# Patient Record
Sex: FEMALE | Race: WHITE | NOT HISPANIC OR LATINO | Employment: UNEMPLOYED | URBAN - METROPOLITAN AREA
[De-identification: names, ages, dates, MRNs, and addresses within clinical notes are randomized per-mention and may not be internally consistent; named-entity substitution may affect disease eponyms.]

---

## 2017-03-26 ENCOUNTER — ALLSCRIPTS OFFICE VISIT (OUTPATIENT)
Dept: OTHER | Facility: OTHER | Age: 37
End: 2017-03-26

## 2018-01-15 VITALS
SYSTOLIC BLOOD PRESSURE: 106 MMHG | BODY MASS INDEX: 21.97 KG/M2 | RESPIRATION RATE: 18 BRPM | HEIGHT: 67 IN | TEMPERATURE: 98.3 F | DIASTOLIC BLOOD PRESSURE: 60 MMHG | WEIGHT: 140 LBS | HEART RATE: 74 BPM | OXYGEN SATURATION: 100 %

## 2018-07-23 ENCOUNTER — OFFICE VISIT (OUTPATIENT)
Dept: URGENT CARE | Facility: CLINIC | Age: 38
End: 2018-07-23
Payer: COMMERCIAL

## 2018-07-23 VITALS
HEART RATE: 66 BPM | BODY MASS INDEX: 21.35 KG/M2 | HEIGHT: 67 IN | TEMPERATURE: 98.8 F | RESPIRATION RATE: 16 BRPM | DIASTOLIC BLOOD PRESSURE: 60 MMHG | WEIGHT: 136 LBS | OXYGEN SATURATION: 100 % | SYSTOLIC BLOOD PRESSURE: 104 MMHG

## 2018-07-23 DIAGNOSIS — S61.219A LACERATION WITHOUT FOREIGN BODY OF UNSPECIFIED FINGER WITHOUT DAMAGE TO NAIL, INITIAL ENCOUNTER: Primary | ICD-10-CM

## 2018-07-23 PROCEDURE — 90471 IMMUNIZATION ADMIN: CPT

## 2018-07-23 PROCEDURE — 99214 OFFICE O/P EST MOD 30 MIN: CPT | Performed by: PHYSICIAN ASSISTANT

## 2018-07-23 PROCEDURE — 90715 TDAP VACCINE 7 YRS/> IM: CPT

## 2018-07-23 NOTE — PROGRESS NOTES
[facility]        NAME: Filiberto Matos is a 43 y o  female  : 1980    MRN: [id number]  DATE: 2018  TIME: 5:48 PM    Assessment and Plan   Laceration without foreign body of unspecified finger without damage to nail, initial encounter [S61 [id number]]  1  Laceration without foreign body of unspecified finger without damage to nail, initial encounter  TDAP Vaccine greater than or equal to 6yo     Laceration repair  Date/Time: 2018 11:56 AM  Performed by: Gerry Vicente by: Ricardo Cobb   Patient identity confirmed: verbally with patient  Body area: upper extremity  Location details: right ring finger  Laceration length: 1 cm  Foreign bodies: no foreign bodies  Tendon involvement: none  Nerve involvement: none  Vascular damage: no      Procedure Details:  Preparation: Patient was prepped and draped in the usual sterile fashion  Irrigation solution: saline  Irrigation method: syringe  Amount of cleaning: standard  Debridement: none  Degree of undermining: none  Skin closure: glue and Steri-Strips  Patient tolerance: Patient tolerated the procedure well with no immediate complications          Patient Instructions   Right Fourth Digit Laceration:   -There is a small, superficial wound of the ventral aspect of the right fourth digit over the DIP joint  The wound was cleansed in a sterile fashion and dermabond was applied and finished with a steri-strip  The wound was dressed and wound care was discussed in depth    -Tdap was given today  -If your wound becomes red, swollen, tender or purulent follow up immediately with your PCP for a recheck  Follow up with PCP in 3-5 days  Proceed to  ER if symptoms worsen  Chief Complaint     Chief Complaint   Patient presents with    Laceration     right hand fourth digit laceration onset this morning while reaching into refrigerator cut finger on aluminum lid of can; Tetanus ????          History of Present Illness       The patient presents today for right fourth digit laceration that she sustained today at 8am  The patient states that she reached into the fridge and cut her finger on a coconut milk can  She states that she is unsure of when her last Tdap vaccine was  She states that hemostasis was achieved and she denies numbness, tingling or weakness of the right fourth digit  Review of Systems   Review of Systems   Constitutional: Negative for chills, fatigue and fever  Skin: Positive for wound (right fourth digit laceration)  Negative for color change, pallor and rash  Allergic/Immunologic: Negative for immunocompromised state  Neurological: Negative for dizziness, weakness, light-headedness and numbness  Hematological: Negative for adenopathy  Current Medications     No current outpatient prescriptions on file  Current Allergies     Allergies as of 07/23/2018    (No Known Allergies)            The following portions of the patient's history were reviewed and updated as appropriate: allergies, current medications, past family history, past medical history, past social history, past surgical history and problem list      History reviewed  No pertinent past medical history  Past Surgical History:   Procedure Laterality Date    WISDOM TOOTH EXTRACTION         Family History   Problem Relation Age of Onset    Stroke Mother     Heart disease Father          Medications have been verified  Objective   /60   Pulse 66   Temp 98 8 °F (37 1 °C)   Resp 16   Ht 5' 7" (1 702 m)   Wt 61 7 kg (136 lb)   LMP 07/04/2018 (Approximate)   SpO2 100%   Breastfeeding? No   BMI 21 30 kg/m²        Physical Exam     Physical Exam   Constitutional: She appears well-developed and well-nourished  No distress  Cardiovascular: Normal rate and regular rhythm  Pulmonary/Chest: Effort normal and breath sounds normal    Musculoskeletal:        Right hand: She exhibits laceration   She exhibits normal range of motion, no tenderness, normal capillary refill, no deformity and no swelling  Normal sensation noted  Normal strength noted  Neurological: She has normal strength  No sensory deficit  Skin: Laceration (There is a  5cm linear laceration of the ventral aspect of the right fourth digit over the DIP joint) noted  She is not diaphoretic  Nursing note and vitals reviewed

## 2018-07-23 NOTE — PATIENT INSTRUCTIONS
Skin Adhesive Care   WHAT YOU NEED TO KNOW:   Skin adhesive is medical glue used to close wounds  It is a substitute for staples and stitches  Skin adhesive wound closures take less time and do not require anesthesia  You have less pain and a lower risk of infection than with staples or stitches  Skin adhesive will fall off after the wound is healed  DISCHARGE INSTRUCTIONS:   Self-care:   · Keep your wound clean and dry  for 1 to 5 days  You can shower 24 hours after the skin adhesive is applied  Lightly pat your wound dry after you shower  · Do not soak  your wound in water, such as in a bath or hot tub  · Do not scrub  your wound or pick at the adhesive  This can make your wound reopen  · Do not apply ointments  to your wound  These include antibiotic and other ointments that contain petroleum jelly  These products will remove skin adhesive and reopen your wound  Follow up with your healthcare provider as directed:  Write down your questions so you remember to ask them during your visits  Contact your healthcare provider if:   · You have a fever  · Your wound is red and warm to touch  · You have questions or concerns about your condition or care  Return to the emergency department if:   · Your wound has fluid draining from it  · Your wound opens  © 2017 [address] Information is for End User's use only and may not be sold, redistributed or otherwise used for commercial purposes  All illustrations and images included in CareNotes® are the copyrighted property of [employer]  or Dustin [employer]  The above information is an  only  It is not intended as medical advice for individual conditions or treatments  Talk to your doctor, nurse or pharmacist before following any medical regimen to see if it is safe and effective for you      Right Fourth Digit Laceration:   -There is a small, superficial wound of the ventral aspect of the right fourth digit over the DIP joint  The wound was cleansed in a sterile fashion and dermabond was applied and finished with a steri-strip  The wound was dressed and wound care was discussed in depth    -Tdap was given today  -If your wound becomes red, swollen, tender or purulent follow up immediately with your PCP for a recheck

## 2019-12-28 ENCOUNTER — OFFICE VISIT (OUTPATIENT)
Dept: URGENT CARE | Facility: CLINIC | Age: 39
End: 2019-12-28
Payer: COMMERCIAL

## 2019-12-28 VITALS
RESPIRATION RATE: 16 BRPM | HEART RATE: 67 BPM | WEIGHT: 138 LBS | TEMPERATURE: 97.4 F | OXYGEN SATURATION: 100 % | DIASTOLIC BLOOD PRESSURE: 62 MMHG | SYSTOLIC BLOOD PRESSURE: 98 MMHG | BODY MASS INDEX: 21.61 KG/M2

## 2019-12-28 DIAGNOSIS — R68.84 JAW PAIN: Primary | ICD-10-CM

## 2019-12-28 PROCEDURE — 99213 OFFICE O/P EST LOW 20 MIN: CPT | Performed by: NURSE PRACTITIONER

## 2019-12-28 RX ORDER — NAPROXEN 500 MG/1
500 TABLET ORAL 2 TIMES DAILY WITH MEALS
Qty: 30 TABLET | Refills: 0 | Status: SHIPPED | OUTPATIENT
Start: 2019-12-28

## 2019-12-28 NOTE — PATIENT INSTRUCTIONS
Naprosyn 500mg twice daily with food   Schedule appt with dentist or oral surgeon as soon as possible  Alternate ice/heat to alleviate symptoms  Temporomandibular Disorder   WHAT YOU NEED TO KNOW:   What is temporomandibular disorder? Temporomandibular disorder is a condition that causes pain in your jaw  The disorder affects the joint between your temporal bone and your mandible (jawbone)  The muscles and nerves around the joint are also affected  What causes temporomandibular disorder? · Dislocation of the cartilage disc in the joint    · Deformities of the jaw    · Inflammation, infection, arthritis, muscle problems, or tumors in the jaw area     · Injury to or fracture of the jawbone    · Muscle strain from chewing or teeth clenching or grinding  What are the signs and symptoms of temporomandibular disorder? · Popping or grating sound when you open or close your mouth    · Headache or pain in your jaw, ear, neck, or face    · Pain or swelling of the jaw muscles    · Tingling or numbness in the jaw or face    · Trouble opening or closing your mouth, or your jaw locks  How is temporomandibular disorder diagnosed? Your healthcare provider will examine your jaw, face, and neck  He will ask you about your health conditions or injuries  You may also need the following tests:  · X-rays: You may need to have x-rays of your skull, jaw, or teeth  · Arthrogram:  This is an x-ray that uses contrast dye to help the pictures show up better  Tell the healthcare provider if you have ever had an allergic reaction to contrast dye  · MRI:  This scan uses powerful magnets and a computer to take pictures of your jaw  You may be given contrast dye to help the pictures show up better  Tell the healthcare provider if you have ever had an allergic reaction to contrast dye  Do not enter the MRI room with anything metal  Metal can cause serious injury   Tell the healthcare provider if you have any metal in or on your body      · Bone scan: This is a test done to look at the bones in your body  The bone scan shows areas where your bone is diseased or damaged  You will get a radioactive liquid, called a tracer, through a vein in your arm  The tracer collects in your bones  Pictures will then be taken to look for problems  Examples of bone problems include fractures (breaks) and infection  How is temporomandibular disorder treated? · Medicines:      ¨ NSAIDs:  These medicines decrease swelling and pain  You can buy NSAIDs without a doctor's order  Ask your healthcare provider which medicine is right for you, and how much to take  Take as directed  NSAIDs can cause stomach bleeding or kidney problems if not taken correctly  ¨ Botulinum toxin:  This may be injected into the muscles of your jaw to decrease pain  Luke Rios Steroid medicine: These may be injected into the joint to decrease pain and swelling  ¨ Muscle relaxers  help decrease pain and muscle spasms  · Surgery: You may need surgery to fix your teeth, jawbone, or the joint  What are the risks of temporomandibular disorder? You may bleed or get an infection if you have surgery  If left untreated, your condition may get worse  You may have trouble breathing, eating, drinking, talking, or opening your mouth  If not treated early, temporomandibular disorder may lead to permanent injury, such as nerve damage, deformity, or paralysis  How can I manage my symptoms? · Eat soft foods: Your healthcare provider may suggest that you eat only soft foods for several days  A dietitian may work with you to find foods that are easier to bite, chew, or swallow  Examples are soup, applesauce, cottage cheese, pudding, yogurt, and soft fruits  · Use jaw supporting devices:  Splints may be used to support your jaw or keep it from moving  You may need to wear a mouth guard to keep you from clenching or grinding your teeth while you are sleeping  · Use ice and heat:   Ice helps decrease swelling and pain  Ice may also help prevent tissue damage  Use an ice pack, or put crushed ice in a plastic bag  Cover it with a towel and place it on your jaw for 15 to 20 minutes every hour or as directed  After the first 24 to 48 hours, use heat to decrease pain, swelling, and muscle spasms  Apply heat on the area for 20 to 30 minutes every 2 hours for as many days as directed  · Go to physical therapy:  A physical therapist teaches you exercises to help improve movement and strength, and to decrease pain in your jaw  A speech therapist may help you with swallowing and speech exercises  When should I contact my healthcare provider? · You have a fever  · Your splint or mouth guard is loose  · You have questions or concerns about your condition or care  When should I seek immediate care or call 911? · You have nausea, are vomiting, or cannot keep liquids down  · You have pain that does not go away even after you take your pain medicine  · You have problems breathing, talking, drinking, eating, or swallowing  · Your splint or mouth guard gets damaged or broken  CARE AGREEMENT:   You have the right to help plan your care  Learn about your health condition and how it may be treated  Discuss treatment options with your caregivers to decide what care you want to receive  You always have the right to refuse treatment  The above information is an  only  It is not intended as medical advice for individual conditions or treatments  Talk to your doctor, nurse or pharmacist before following any medical regimen to see if it is safe and effective for you  © 2017 [address]  Information is for End User's use only and may not be sold, redistributed or otherwise used for commercial purposes  All illustrations and images included in CareNotes® are the copyrighted property of [employer]  or [employer]in Huff

## 2019-12-28 NOTE — PROGRESS NOTES
[facility]        NAME: Flo Lou is a 35 y o  female  : 1980    MRN: [id number]  DATE: 2019  TIME: 12:13 PM    Assessment and Plan     1  Jaw pain  Suspect TMJ  Follow up with dentist/oral surgeon  - naproxen (NAPROSYN) 500 mg tablet; Take 1 tablet (500 mg total) by mouth 2 (two) times a day with meals  Dispense: 30 tablet; Refill: 0        Patient Instructions     Naprosyn 500mg twice daily with food   Schedule appt with dentist or oral surgeon as soon as possible  Alternate ice/heat to alleviate symptoms  Chief Complaint     Chief Complaint   Patient presents with    Pain     pt presents with pain on the right side of the jaw/ear; raditing down in to shoulder         History of Present Illness       Here for right side jaw pain  Started a few weeks ago  Tried using a mouth guard  Thinks may be from grinding/clenching  Went to chiropractor   Feels like teeth are out of alignment  Pain is radiating from jaw to right ear and into right shoulder  Review of Systems   Review of Systems   Constitutional: Negative for fever  HENT: Positive for ear pain (right)  Negative for congestion and sore throat  Pain right jaw   Neurological: Negative for headaches  Current Medications     No current outpatient medications on file      Current Allergies     Allergies as of 2019    (No Known Allergies)            The following portions of the patient's history were reviewed and updated as appropriate: allergies, current medications, past family history, past medical history, past social history, past surgical history and problem list      Past Medical History:   Diagnosis Date    Patient denies medical problems        Past Surgical History:   Procedure Laterality Date    WISDOM TOOTH EXTRACTION         Family History   Problem Relation Age of Onset    Stroke Mother     Heart disease Father          Medications have been verified  Objective   BP 98/62   Pulse 67   Temp (!) 97 4 °F (36 3 °C)   Resp 16   Wt 62 6 kg (138 lb)   LMP 12/17/2019   SpO2 100%   BMI 21 61 kg/m²        Physical Exam     Physical Exam   Constitutional: She appears well-developed and well-nourished  HENT:   Mouth/Throat: Oropharynx is clear and moist    Palpable and audible click right jaw  TMs wnl b/l     Neck: Normal range of motion  Neck supple  Cardiovascular: Normal rate  Pulmonary/Chest: Effort normal    Skin: Skin is warm  Vitals reviewed